# Patient Record
Sex: MALE | Race: WHITE | NOT HISPANIC OR LATINO | Employment: FULL TIME | URBAN - NONMETROPOLITAN AREA
[De-identification: names, ages, dates, MRNs, and addresses within clinical notes are randomized per-mention and may not be internally consistent; named-entity substitution may affect disease eponyms.]

---

## 2019-11-16 ENCOUNTER — OFFICE VISIT (OUTPATIENT)
Dept: URGENT CARE | Facility: CLINIC | Age: 19
End: 2019-11-16
Payer: COMMERCIAL

## 2019-11-16 VITALS
SYSTOLIC BLOOD PRESSURE: 122 MMHG | TEMPERATURE: 98.4 F | HEART RATE: 101 BPM | BODY MASS INDEX: 30.35 KG/M2 | OXYGEN SATURATION: 97 % | RESPIRATION RATE: 16 BRPM | WEIGHT: 212 LBS | HEIGHT: 70 IN | DIASTOLIC BLOOD PRESSURE: 64 MMHG

## 2019-11-16 DIAGNOSIS — J30.89 NON-SEASONAL ALLERGIC RHINITIS, UNSPECIFIED TRIGGER: ICD-10-CM

## 2019-11-16 DIAGNOSIS — J45.21 MILD INTERMITTENT ASTHMA WITH ACUTE EXACERBATION: ICD-10-CM

## 2019-11-16 PROCEDURE — 99203 OFFICE O/P NEW LOW 30 MIN: CPT | Performed by: INTERNAL MEDICINE

## 2019-11-16 RX ORDER — BUPROPION HYDROCHLORIDE 75 MG/1
75 TABLET ORAL 2 TIMES DAILY
COMMUNITY

## 2019-11-16 RX ORDER — DIPHENOXYLATE HYDROCHLORIDE AND ATROPINE SULFATE 2.5; .025 MG/1; MG/1
1 TABLET ORAL 4 TIMES DAILY PRN
COMMUNITY

## 2019-11-16 RX ORDER — ALBUTEROL SULFATE 90 UG/1
2 AEROSOL, METERED RESPIRATORY (INHALATION) EVERY 6 HOURS PRN
Qty: 8.5 G | Refills: 0 | Status: SHIPPED | OUTPATIENT
Start: 2019-11-16 | End: 2019-11-23

## 2019-11-16 RX ORDER — PREDNISONE 20 MG/1
20 TABLET ORAL DAILY
Qty: 5 TAB | Refills: 0 | Status: SHIPPED | OUTPATIENT
Start: 2019-11-16 | End: 2019-11-21

## 2019-11-16 RX ORDER — QUETIAPINE FUMARATE 25 MG/1
25 TABLET, FILM COATED ORAL 2 TIMES DAILY
COMMUNITY

## 2019-11-16 RX ORDER — GABAPENTIN 100 MG/1
100 CAPSULE ORAL 3 TIMES DAILY
COMMUNITY

## 2019-11-16 SDOH — HEALTH STABILITY: MENTAL HEALTH: HOW OFTEN DO YOU HAVE A DRINK CONTAINING ALCOHOL?: NEVER

## 2019-11-16 ASSESSMENT — ENCOUNTER SYMPTOMS
FEVER: 0
SHORTNESS OF BREATH: 1
WHEEZING: 1
RHINORRHEA: 1
COUGH: 1

## 2019-11-16 NOTE — PROGRESS NOTES
Subjective:     Sanju Dickerson is a 19 y.o. male who presents for Cough (difficulty of breathing, asthma is acting up going on for 2 months)       Cough   This is a new problem. The current episode started more than 1 month ago. The problem has been gradually worsening. The cough is productive of sputum. Associated symptoms include nasal congestion, rhinorrhea, shortness of breath and wheezing. Pertinent negatives include no ear pain or fever. His past medical history is significant for asthma.   History reviewed. No pertinent past medical history.History reviewed. No pertinent surgical history.  Social History     Socioeconomic History   • Marital status: Single     Spouse name: Not on file   • Number of children: Not on file   • Years of education: Not on file   • Highest education level: Not on file   Occupational History   • Not on file   Social Needs   • Financial resource strain: Not on file   • Food insecurity:     Worry: Not on file     Inability: Not on file   • Transportation needs:     Medical: Not on file     Non-medical: Not on file   Tobacco Use   • Smoking status: Never Smoker   • Smokeless tobacco: Never Used   Substance and Sexual Activity   • Alcohol use: Never     Frequency: Never   • Drug use: Not on file   • Sexual activity: Not on file   Lifestyle   • Physical activity:     Days per week: Not on file     Minutes per session: Not on file   • Stress: Not on file   Relationships   • Social connections:     Talks on phone: Not on file     Gets together: Not on file     Attends Gnosticist service: Not on file     Active member of club or organization: Not on file     Attends meetings of clubs or organizations: Not on file     Relationship status: Not on file   • Intimate partner violence:     Fear of current or ex partner: Not on file     Emotionally abused: Not on file     Physically abused: Not on file     Forced sexual activity: Not on file   Other Topics Concern   • Not on file   Social  "History Narrative   • Not on file    History reviewed. No pertinent family history. Review of Systems   Constitutional: Negative for fever.   HENT: Positive for rhinorrhea. Negative for ear pain.    Respiratory: Positive for cough, shortness of breath and wheezing.    All other systems reviewed and are negative.    Allergies   Allergen Reactions   • Other Environmental      Dust, weed      Objective:   /64   Pulse (!) 101   Temp 36.9 °C (98.4 °F)   Resp 16   Ht 1.778 m (5' 10\")   Wt 96.2 kg (212 lb)   SpO2 97%   BMI 30.42 kg/m²   Physical Exam  Constitutional:       General: He is not in acute distress.     Appearance: He is well-developed.   HENT:      Head: Normocephalic and atraumatic.      Nose: Congestion present.   Eyes:      Conjunctiva/sclera: Conjunctivae normal.      Pupils: Pupils are equal, round, and reactive to light.   Neck:      Musculoskeletal: Normal range of motion and neck supple.   Cardiovascular:      Rate and Rhythm: Normal rate and regular rhythm.      Heart sounds: No murmur.   Pulmonary:      Effort: Pulmonary effort is normal. No respiratory distress.      Breath sounds: Normal breath sounds.   Abdominal:      General: There is no distension.      Palpations: Abdomen is soft.      Tenderness: There is no tenderness.   Skin:     General: Skin is warm and dry.      Capillary Refill: Capillary refill takes less than 2 seconds.   Neurological:      Mental Status: He is alert and oriented to person, place, and time.      Sensory: No sensory deficit.      Deep Tendon Reflexes: Reflexes are normal and symmetric.           Assessment/Plan:   Assessment    1. Mild intermittent asthma with acute exacerbation    2. Non-seasonal allergic rhinitis, unspecified trigger    Other orders  - QUEtiapine (SEROQUEL) 25 MG Tab; Take 25 mg by mouth 2 times a day.  - diphenoxylate-atropine (LOMOTIL) 2.5-0.025 MG Tab; Take 1 Tab by mouth 4 times a day as needed for Diarrhea.  - buPROPion (WELLBUTRIN) " 75 MG Tab; Take 75 mg by mouth 2 times a day.  - NS SOLN 60 mL with albuterol 2.5 mg/0.5 mL NEBU 5 mL; 5 mg/hr by Nebulization route.  - gabapentin (NEURONTIN) 100 MG Cap; Take 100 mg by mouth 3 times a day.      Differential diagnosis, natural history, supportive care, and indications for immediate follow-up discussed.

## 2019-11-19 ENCOUNTER — TELEPHONE (OUTPATIENT)
Dept: URGENT CARE | Facility: CLINIC | Age: 19
End: 2019-11-19

## 2019-11-19 NOTE — TELEPHONE ENCOUNTER
Patient's mom called and asked if she can get her sons prescription changed to [facility] respiclick. The reason is because the copay is too high on the original prescription and she was able to find this medication through the pharmacy that is affordable. Please call her.     Called and left message to call back

## 2019-11-21 ENCOUNTER — TELEPHONE (OUTPATIENT)
Dept: URGENT CARE | Facility: CLINIC | Age: 19
End: 2019-11-21

## 2019-11-21 DIAGNOSIS — J45.21 MILD INTERMITTENT ASTHMA WITH ACUTE EXACERBATION: ICD-10-CM

## 2019-11-21 NOTE — TELEPHONE ENCOUNTER
Called in pulmocort generic and asked pt to     Pt needs a call back in regards to switching the medication because he can not afford the one that was prescribed.     Called pt and left message and called pharmacy and that is the only generic available and she suggested to call his insurance and to see if there is any that has lower co pay

## 2019-11-23 ENCOUNTER — TELEPHONE (OUTPATIENT)
Dept: URGENT CARE | Facility: CLINIC | Age: 19
End: 2019-11-23

## 2019-11-23 DIAGNOSIS — J45.21 MILD INTERMITTENT ASTHMA WITH ACUTE EXACERBATION: ICD-10-CM

## 2019-11-26 ENCOUNTER — TELEPHONE (OUTPATIENT)
Dept: URGENT CARE | Facility: CLINIC | Age: 19
End: 2019-11-26

## 2019-11-26 RX ORDER — FLUTICASONE PROPIONATE AND SALMETEROL 232; 14 UG/1; UG/1
1 POWDER, METERED RESPIRATORY (INHALATION)
Qty: 1 EACH | Refills: 1 | Status: SHIPPED | OUTPATIENT
Start: 2019-11-26 | End: 2020-09-02 | Stop reason: SDUPTHER

## 2019-11-26 NOTE — TELEPHONE ENCOUNTER
Patient called in stating that her sons medication that was initially called in for his asthma was to expensive and requested to have [facility] respiclick called in. Advised the patient that I called the provider and had the correct medication called in to the pharmacy on file.

## 2020-09-02 ENCOUNTER — OFFICE VISIT (OUTPATIENT)
Dept: PULMONOLOGY | Facility: HOSPICE | Age: 20
End: 2020-09-02

## 2020-09-02 VITALS
OXYGEN SATURATION: 98 % | BODY MASS INDEX: 28.63 KG/M2 | WEIGHT: 200 LBS | TEMPERATURE: 98.1 F | SYSTOLIC BLOOD PRESSURE: 124 MMHG | RESPIRATION RATE: 16 BRPM | DIASTOLIC BLOOD PRESSURE: 80 MMHG | HEIGHT: 70 IN | HEART RATE: 82 BPM

## 2020-09-02 DIAGNOSIS — J45.21 MILD INTERMITTENT ASTHMA WITH ACUTE EXACERBATION: ICD-10-CM

## 2020-09-02 DIAGNOSIS — J45.30 MILD PERSISTENT ASTHMA WITHOUT COMPLICATION: ICD-10-CM

## 2020-09-02 DIAGNOSIS — J30.2 SEASONAL ALLERGIC RHINITIS, UNSPECIFIED TRIGGER: ICD-10-CM

## 2020-09-02 DIAGNOSIS — R05.9 COUGH: ICD-10-CM

## 2020-09-02 PROCEDURE — 99204 OFFICE O/P NEW MOD 45 MIN: CPT | Performed by: INTERNAL MEDICINE

## 2020-09-02 RX ORDER — ALBUTEROL SULFATE 2.5 MG/3ML
2.5 SOLUTION RESPIRATORY (INHALATION) EVERY 4 HOURS PRN
Qty: 30 BULLET | Refills: 4 | Status: SHIPPED | OUTPATIENT
Start: 2020-09-02

## 2020-09-02 RX ORDER — FLUTICASONE PROPIONATE AND SALMETEROL 232; 14 UG/1; UG/1
1 POWDER, METERED RESPIRATORY (INHALATION)
Qty: 1 EACH | Refills: 1 | Status: SHIPPED | OUTPATIENT
Start: 2020-09-02

## 2020-09-02 RX ORDER — METHYLPREDNISOLONE 4 MG/1
TABLET ORAL
Qty: 21 TAB | Refills: 0 | Status: SHIPPED | OUTPATIENT
Start: 2020-09-02

## 2020-09-02 RX ORDER — ALBUTEROL SULFATE 90 UG/1
2 AEROSOL, METERED RESPIRATORY (INHALATION) EVERY 4 HOURS PRN
Qty: 1 EACH | Refills: 3 | Status: SHIPPED | OUTPATIENT
Start: 2020-09-02

## 2020-09-02 RX ORDER — ALBUTEROL SULFATE 2.5 MG/3ML
2.5 SOLUTION RESPIRATORY (INHALATION) EVERY 4 HOURS PRN
COMMUNITY
End: 2020-09-02 | Stop reason: SDUPTHER

## 2020-09-02 ASSESSMENT — PAIN SCALES - GENERAL: PAINLEVEL: NO PAIN

## 2020-09-02 NOTE — PATIENT INSTRUCTIONS
Sanju Dickerson comes in today with his mother, he has asthma, she describes a unique situation where he has silent asthma, no wheezing, but goes to the emergency room periodically.  She says they have turned him away many times because he does not have any wheezing, only to return and then require more intense treatment.    He is presently on Advair inhaler, albuterol inhaler, nebulizer.  We considered a steroid Dosepak but he tells me that when he flares, symptoms are so bad he needs to go to the emergency room and that will be accomplished.    In addition, he present lives in [place], is in a hotel, the testing that we considered included x-ray allergy panel and IgE and eosinophil counts but his mother tells me that they cannot afford that at this time.    In addition his symptoms are so unusual that I think it is extremely important for him to go to the emergency room, if he has a flare, I do not think he actually has active wheezing today but he tells me that his symptoms are daily, aggravated by smoke, fabric fibers, but it is unclear to me how we should address his treatment.    Most importantly, he lives in [place], his episodes come on with a significant enough intensity that I think he should be seen locally for the acute flare, once he reestablishes with his primary care and we can see him on a periodic basis.

## 2020-09-02 NOTE — PROGRESS NOTES
Sanju Dickerson is a 20 y.o. male here for asthma. Patient was referred by [facility].    History of Present Illness:      Sanju Dickerson comes in today with his mother, he has asthma, she describes a unique situation where he has silent asthma, no wheezing, but goes to the emergency room periodically.  She says they have turned him away many times because he does not have any wheezing, only to return and then require more intense treatment.    He is presently on Advair inhaler, albuterol inhaler, nebulizer.  We considered a steroid Dosepak but he tells me that when he flares, symptoms are so bad he needs to go to the emergency room and that will be noted    In addition, he present lives in [place], is in a hotel, the testing that we considered included x-ray allergy panel and IgE and eosinophil counts but his mother tells me that they cannot afford that at this time.    In addition his symptoms are so unusual that I think it is extremely important for him to go to the emergency room, if he has a flare, I do not think he actually has active wheezing today but he tells me that his symptoms are daily, aggravated by smoke, fabric fibers, but it is unclear to me how we should address his treatment.    Most importantly, he lives in [place], his episodes come on with a significant enough intensity that I think he should be seen locally for the acute flare, once he reestablishes with his primary care and we can see him on a periodic basis.    Constitutional ROS: No unexpected change in weight, No unexplained fevers  Eyes: No change in vision or blurring or double vision  Mouth/Throat ROS: No sore throat, No recent change in voice or hoarseness  Pulmonary ROS: See present history for pertinent positives  Cardiovascular ROS: No chest pain to suggest acute coronary syndrome  Gastrointestinal ROS: No abdominal pain to suggest peptic disease  Musculoskeletal/Extremities ROS: no acute artritis or unusual  "swelling  Hematologic/Lymphatic ROS: No easy bleeding or unusual lymph node swelling  Neurologic ROS: No new or unusual weakness  Allergic/Immunologic: No  urticaria or allergic rash      Current Outpatient Medications   Medication Sig Dispense Refill   • albuterol (PROAIR HFA) 108 (90 Base) MCG/ACT Aero Soln inhalation aerosol Inhale 2 Puffs by mouth every four hours as needed for Shortness of Breath (wheezing). 1 Each 3   • Fluticasone-Salmeterol 232-14 MCG/ACT AEROSOL POWDER, BREATH ACTIVATED Inhale 1 Puff by mouth 2 Times a Day. 1 Each 1   • methylPREDNISolone (MEDROL DOSEPAK) 4 MG Tablet Therapy Pack Take as directed. 21 Tab 0   • albuterol (PROVENTIL) 2.5mg/3ml Nebu Soln solution for nebulization 3 mL by Nebulization route every four hours as needed for Shortness of Breath. 30 Bullet 4   • QUEtiapine (SEROQUEL) 25 MG Tab Take 25 mg by mouth 2 times a day.     • diphenoxylate-atropine (LOMOTIL) 2.5-0.025 MG Tab Take 1 Tab by mouth 4 times a day as needed for Diarrhea.     • buPROPion (WELLBUTRIN) 75 MG Tab Take 75 mg by mouth 2 times a day.     • gabapentin (NEURONTIN) 100 MG Cap Take 100 mg by mouth 3 times a day.       No current facility-administered medications for this visit.        Social History     Tobacco Use   • Smoking status: Never Smoker   • Smokeless tobacco: Never Used   Substance Use Topics   • Alcohol use: Never     Frequency: Never   • Drug use: Not on file        No past medical history on file.    No past surgical history on file.    Allergies: Other environmental    No family history on file.    Physical Examination    Vitals:    09/02/20 1536   Height: 1.778 m (5' 10\")   Weight: 90.7 kg (200 lb)   Weight % change since last entry.: 0 %   BP: 124/80   Pulse: 82   BMI (Calculated): 28.7   Resp: 16   Temp: 36.7 °C (98.1 °F)   TempSrc: Temporal       General Appearance: alert, no distress  Skin: Skin color, texture, turgor normal. No rashes or lesions.  Eyes: negative  Oropharynx: Lips, " "mucosa, and tongue normal. Teeth and gums normal. Oropharynx moist and without lesion  Lungs: positive findings: Quiet and clear with no wheezing  Heart: negative. RRR without murmur, gallop, or rubs.  No ectopy.  Abdomen: Abdomen soft, non-tender. . No masses,  No organomegaly  Extremities:  No deformities, edema, or skin discoloration  Joints: No acute arthritis  Peripheral Pulses:perfused  Neurologic: intact grossly  No adenopathy    Imaging: None presently, declined    PFTS: None presently, declined      Assessment and Plan  1. Mild persistent asthma without complication  Patient does not have active wheezing, historical data only, tells me he is a \"silent wheezer\"  - Fluticasone-Salmeterol 232-14 MCG/ACT AEROSOL POWDER, BREATH ACTIVATED; Inhale 1 Puff by mouth 2 Times a Day.  Dispense: 1 Each; Refill: 1    2. Cough  None presently    3. Seasonal allergic rhinitis, unspecified trigger  Noted    4. Mild intermittent asthma with acute exacerbation  Maintenance medications, no flare presently  - Fluticasone-Salmeterol 232-14 MCG/ACT AEROSOL POWDER, BREATH ACTIVATED; Inhale 1 Puff by mouth 2 Times a Day.  Dispense: 1 Each; Refill: 1      Unusual situation with patient describing \"silent wheezing\", multiple ER visits, no clear direction, cannot do testing as he has no insurance and declines that presently, but would like to do an x-ray lung function testing and allergy panel when possible.  In the meantime continue maintenance medications, but he will need to go to the emergency room if he has any flares given the unusual circumstances and unusual pattern of his asthma.    Followup Return in about 6 months (around 3/2/2021) for With MD or APRN.    "